# Patient Record
Sex: FEMALE | Race: AMERICAN INDIAN OR ALASKA NATIVE | HISPANIC OR LATINO
[De-identification: names, ages, dates, MRNs, and addresses within clinical notes are randomized per-mention and may not be internally consistent; named-entity substitution may affect disease eponyms.]

---

## 2023-08-29 PROBLEM — Z00.00 ENCOUNTER FOR PREVENTIVE HEALTH EXAMINATION: Status: ACTIVE | Noted: 2023-08-29

## 2023-09-06 ENCOUNTER — LABORATORY RESULT (OUTPATIENT)
Age: 33
End: 2023-09-06

## 2023-09-07 ENCOUNTER — NON-APPOINTMENT (OUTPATIENT)
Age: 33
End: 2023-09-07

## 2023-09-07 ENCOUNTER — APPOINTMENT (OUTPATIENT)
Dept: OBGYN | Facility: CLINIC | Age: 33
End: 2023-09-07
Payer: COMMERCIAL

## 2023-09-07 DIAGNOSIS — N91.2 AMENORRHEA, UNSPECIFIED: ICD-10-CM

## 2023-09-07 PROCEDURE — 76817 TRANSVAGINAL US OBSTETRIC: CPT

## 2023-09-07 PROCEDURE — 99204 OFFICE O/P NEW MOD 45 MIN: CPT

## 2023-09-07 NOTE — HISTORY OF PRESENT ILLNESS
[FreeTextEntry1] : ----32 Y/O   LMP 23 EDC  24 EGA 6 WEEKS. PMHX;/ PSHX;/APPY, ADENOIDS SOCIAL HX;-ETOH -CIGG  STD;    HPV/        STD PREVENTION DISCUSSED FAMILY HISTORY OF BREAST CANCER;MATERNAL GREAT GM REVIEW OF SYMPTOMS DONE; ALLERGIES; pt answered NKDA Medication reconciliation was completed by reviewing, with the patient's involvement, the patient's current outpatient medications and those  ordered for the patient today.           PE; ABDOMEN;SOFT NT ND NL GENITALIA VAGINA -DC CERVIX;-CMT UTERUS;6 WEEKS SIZE NT AV ADNEXA; NT - MASSES   A;P;EARLY PREGNANCY -PAP  GC CHLAMYDIA -LABS -MVI -SONO -RTC 4 WEEKS.

## 2023-09-08 ENCOUNTER — NON-APPOINTMENT (OUTPATIENT)
Age: 33
End: 2023-09-08

## 2023-09-08 LAB
BASOPHILS # BLD AUTO: 0.05 K/UL
BASOPHILS NFR BLD AUTO: 0.6 %
EOSINOPHIL # BLD AUTO: 0.09 K/UL
EOSINOPHIL NFR BLD AUTO: 1 %
HCT VFR BLD CALC: 38.2 %
HGB BLD-MCNC: 12.6 G/DL
IMM GRANULOCYTES NFR BLD AUTO: 0.2 %
LYMPHOCYTES # BLD AUTO: 2.26 K/UL
LYMPHOCYTES NFR BLD AUTO: 25.4 %
MAN DIFF?: NORMAL
MCHC RBC-ENTMCNC: 29.2 PG
MCHC RBC-ENTMCNC: 33 G/DL
MCV RBC AUTO: 88.4 FL
MONOCYTES # BLD AUTO: 0.63 K/UL
MONOCYTES NFR BLD AUTO: 7.1 %
NEUTROPHILS # BLD AUTO: 5.84 K/UL
NEUTROPHILS NFR BLD AUTO: 65.7 %
PLATELET # BLD AUTO: 361 K/UL
RBC # BLD: 4.32 M/UL
RBC # FLD: 12.6 %
WBC # FLD AUTO: 8.89 K/UL

## 2023-09-10 LAB
ABO + RH PNL BLD: NORMAL
BLD GP AB SCN SERPL QL: NORMAL
C TRACH RRNA SPEC QL NAA+PROBE: NOT DETECTED
HBV SURFACE AG SER QL: NONREACTIVE
HCG SERPL-MCNC: ABNORMAL MIU/ML
HGB A MFR BLD: 97.5 %
HGB A2 MFR BLD: 2.5 %
HGB FRACT BLD-IMP: NORMAL
HIV1+2 AB SPEC QL IA.RAPID: NONREACTIVE
HPV HIGH+LOW RISK DNA PNL CVX: DETECTED
MEV IGG FLD QL IA: 14.7 AU/ML
MEV IGG+IGM SER-IMP: NORMAL
N GONORRHOEA RRNA SPEC QL NAA+PROBE: NOT DETECTED
PROGEST SERPL-MCNC: 16.7 NG/ML
RUBV IGG FLD-ACNC: 19 INDEX
RUBV IGG SER-IMP: POSITIVE
SOURCE AMPLIFICATION: NORMAL
T PALLIDUM AB SER QL IA: NEGATIVE
VZV AB TITR SER: NEGATIVE
VZV IGG SER IF-ACNC: 49.3 INDEX

## 2023-09-11 ENCOUNTER — NON-APPOINTMENT (OUTPATIENT)
Age: 33
End: 2023-09-11

## 2023-09-15 ENCOUNTER — NON-APPOINTMENT (OUTPATIENT)
Age: 33
End: 2023-09-15

## 2023-09-18 LAB
AR GENE MUT ANL BLD/T: NORMAL
CFTR MUT TESTED BLD/T: NEGATIVE

## 2023-09-19 LAB — CYTOLOGY CVX/VAG DOC THIN PREP: ABNORMAL

## 2023-09-21 ENCOUNTER — APPOINTMENT (OUTPATIENT)
Dept: OBGYN | Facility: CLINIC | Age: 33
End: 2023-09-21
Payer: COMMERCIAL

## 2023-09-21 PROCEDURE — 76817 TRANSVAGINAL US OBSTETRIC: CPT

## 2023-10-12 ENCOUNTER — APPOINTMENT (OUTPATIENT)
Dept: OBGYN | Facility: CLINIC | Age: 33
End: 2023-10-12
Payer: COMMERCIAL

## 2023-10-12 PROCEDURE — 0502F SUBSEQUENT PRENATAL CARE: CPT

## 2023-10-20 LAB
CHROMOSOME13 INTERPRETATION: NORMAL
CHROMOSOME13 TEST RESULT: NORMAL
CHROMOSOME18 INTERPRETATION: NORMAL
CHROMOSOME18 TEST RESULT: NORMAL
CHROMOSOME21 INTERPRETATION: NORMAL
CHROMOSOME21 TEST RESULT: NORMAL
FETAL FRACTION: NORMAL
MICRODELETIONS INTERPRETATION: NORMAL
MICRODELETIONS RESULT: NORMAL
PERFORMANCE AND LIMITATIONS: NORMAL
SEX CHROMOSOME INTERPRETATION: NORMAL
SEX CHROMOSOME TEST RESULT: NORMAL
VERIFI WITH MICRODELETIONS: NOT DETECTED

## 2023-10-23 ENCOUNTER — NON-APPOINTMENT (OUTPATIENT)
Age: 33
End: 2023-10-23

## 2023-10-24 ENCOUNTER — ASOB RESULT (OUTPATIENT)
Age: 33
End: 2023-10-24

## 2023-10-24 ENCOUNTER — APPOINTMENT (OUTPATIENT)
Dept: ANTEPARTUM | Facility: CLINIC | Age: 33
End: 2023-10-24
Payer: COMMERCIAL

## 2023-10-24 VITALS
HEART RATE: 89 BPM | DIASTOLIC BLOOD PRESSURE: 75 MMHG | BODY MASS INDEX: 29.19 KG/M2 | SYSTOLIC BLOOD PRESSURE: 118 MMHG | HEIGHT: 64 IN | WEIGHT: 171 LBS

## 2023-10-24 PROCEDURE — 76813 OB US NUCHAL MEAS 1 GEST: CPT

## 2023-11-10 ENCOUNTER — APPOINTMENT (OUTPATIENT)
Dept: OBGYN | Facility: CLINIC | Age: 33
End: 2023-11-10

## 2023-11-13 ENCOUNTER — APPOINTMENT (OUTPATIENT)
Dept: OBGYN | Facility: CLINIC | Age: 33
End: 2023-11-13
Payer: COMMERCIAL

## 2023-11-13 PROCEDURE — 0502F SUBSEQUENT PRENATAL CARE: CPT

## 2023-11-20 LAB
AFP MOM: 1.58
AFP VALUE: 49.3 NG/ML
ALPHA FETOPROTEIN SERUM COMMENT: NORMAL
ALPHA FETOPROTEIN SERUM INTERPRETATION: NORMAL
ALPHA FETOPROTEIN SERUM RESULTS: NORMAL
ALPHA FETOPROTEIN SERUM TEST RESULTS: NORMAL
GESTATIONAL AGE BASED ON: NORMAL
GESTATIONAL AGE ON COLLECTION DATE: 16 WEEKS
INSULIN DEP DIABETES: NO
MATERNAL AGE AT EDD AFP: 33.8 YR
MULTIPLE GESTATION: NO
OSBR RISK 1 IN: 2212
RACE: NORMAL
WEIGHT AFP: 170 LBS

## 2023-11-24 ENCOUNTER — NON-APPOINTMENT (OUTPATIENT)
Age: 33
End: 2023-11-24

## 2023-12-14 ENCOUNTER — ASOB RESULT (OUTPATIENT)
Age: 33
End: 2023-12-14

## 2023-12-14 ENCOUNTER — APPOINTMENT (OUTPATIENT)
Dept: ANTEPARTUM | Facility: CLINIC | Age: 33
End: 2023-12-14
Payer: COMMERCIAL

## 2023-12-14 VITALS
HEART RATE: 100 BPM | HEIGHT: 64 IN | BODY MASS INDEX: 29.88 KG/M2 | DIASTOLIC BLOOD PRESSURE: 68 MMHG | WEIGHT: 175 LBS | SYSTOLIC BLOOD PRESSURE: 116 MMHG

## 2023-12-14 PROCEDURE — 76805 OB US >/= 14 WKS SNGL FETUS: CPT | Mod: 59

## 2023-12-14 PROCEDURE — 76817 TRANSVAGINAL US OBSTETRIC: CPT

## 2023-12-18 ENCOUNTER — APPOINTMENT (OUTPATIENT)
Dept: OBGYN | Facility: CLINIC | Age: 33
End: 2023-12-18
Payer: COMMERCIAL

## 2023-12-18 PROCEDURE — 0502F SUBSEQUENT PRENATAL CARE: CPT

## 2024-01-18 ENCOUNTER — APPOINTMENT (OUTPATIENT)
Dept: OBGYN | Facility: CLINIC | Age: 34
End: 2024-01-18
Payer: COMMERCIAL

## 2024-01-18 PROCEDURE — 0502F SUBSEQUENT PRENATAL CARE: CPT

## 2024-02-12 ENCOUNTER — APPOINTMENT (OUTPATIENT)
Dept: OBGYN | Facility: CLINIC | Age: 34
End: 2024-02-12
Payer: COMMERCIAL

## 2024-02-12 PROCEDURE — 0502F SUBSEQUENT PRENATAL CARE: CPT

## 2024-02-14 LAB
BASOPHILS # BLD AUTO: 0.04 K/UL
BASOPHILS NFR BLD AUTO: 0.4 %
EOSINOPHIL # BLD AUTO: 0.12 K/UL
EOSINOPHIL NFR BLD AUTO: 1.1 %
GLUCOSE 1H P 50 G GLC PO SERPL-MCNC: 148 MG/DL
HCT VFR BLD CALC: 33.2 %
HGB BLD-MCNC: 10.8 G/DL
IMM GRANULOCYTES NFR BLD AUTO: 0.9 %
LYMPHOCYTES # BLD AUTO: 1.73 K/UL
LYMPHOCYTES NFR BLD AUTO: 15.5 %
MAN DIFF?: NORMAL
MCHC RBC-ENTMCNC: 29.2 PG
MCHC RBC-ENTMCNC: 32.5 G/DL
MCV RBC AUTO: 89.7 FL
MONOCYTES # BLD AUTO: 0.69 K/UL
MONOCYTES NFR BLD AUTO: 6.2 %
NEUTROPHILS # BLD AUTO: 8.5 K/UL
NEUTROPHILS NFR BLD AUTO: 75.9 %
PLATELET # BLD AUTO: 318 K/UL
PMV BLD AUTO: 0 /100 WBCS
RBC # BLD: 3.7 M/UL
RBC # FLD: 13.5 %
WBC # FLD AUTO: 11.18 K/UL

## 2024-02-15 ENCOUNTER — NON-APPOINTMENT (OUTPATIENT)
Age: 34
End: 2024-02-15

## 2024-02-21 ENCOUNTER — NON-APPOINTMENT (OUTPATIENT)
Age: 34
End: 2024-02-21

## 2024-02-22 ENCOUNTER — NON-APPOINTMENT (OUTPATIENT)
Age: 34
End: 2024-02-22

## 2024-03-01 ENCOUNTER — NON-APPOINTMENT (OUTPATIENT)
Age: 34
End: 2024-03-01

## 2024-03-04 ENCOUNTER — NON-APPOINTMENT (OUTPATIENT)
Age: 34
End: 2024-03-04

## 2024-03-07 ENCOUNTER — APPOINTMENT (OUTPATIENT)
Dept: ANTEPARTUM | Facility: CLINIC | Age: 34
End: 2024-03-07
Payer: COMMERCIAL

## 2024-03-07 ENCOUNTER — ASOB RESULT (OUTPATIENT)
Age: 34
End: 2024-03-07

## 2024-03-07 VITALS
HEIGHT: 64 IN | DIASTOLIC BLOOD PRESSURE: 76 MMHG | SYSTOLIC BLOOD PRESSURE: 118 MMHG | BODY MASS INDEX: 31.07 KG/M2 | HEART RATE: 100 BPM | WEIGHT: 182 LBS

## 2024-03-07 PROCEDURE — 76816 OB US FOLLOW-UP PER FETUS: CPT

## 2024-03-14 ENCOUNTER — APPOINTMENT (OUTPATIENT)
Dept: OBGYN | Facility: CLINIC | Age: 34
End: 2024-03-14
Payer: COMMERCIAL

## 2024-03-14 PROCEDURE — 0502F SUBSEQUENT PRENATAL CARE: CPT

## 2024-03-28 ENCOUNTER — APPOINTMENT (OUTPATIENT)
Dept: OBGYN | Facility: CLINIC | Age: 34
End: 2024-03-28
Payer: COMMERCIAL

## 2024-03-28 PROCEDURE — 0502F SUBSEQUENT PRENATAL CARE: CPT

## 2024-04-04 ENCOUNTER — APPOINTMENT (OUTPATIENT)
Dept: OBGYN | Facility: CLINIC | Age: 34
End: 2024-04-04
Payer: COMMERCIAL

## 2024-04-04 PROCEDURE — 76819 FETAL BIOPHYS PROFIL W/O NST: CPT | Mod: 59

## 2024-04-04 PROCEDURE — 0502F SUBSEQUENT PRENATAL CARE: CPT

## 2024-04-04 PROCEDURE — 76816 OB US FOLLOW-UP PER FETUS: CPT

## 2024-04-07 LAB — B-HEM STREP SPEC QL CULT: NORMAL

## 2024-04-11 ENCOUNTER — APPOINTMENT (OUTPATIENT)
Dept: OBGYN | Facility: CLINIC | Age: 34
End: 2024-04-11
Payer: COMMERCIAL

## 2024-04-11 VITALS
DIASTOLIC BLOOD PRESSURE: 89 MMHG | BODY MASS INDEX: 31.07 KG/M2 | WEIGHT: 182 LBS | SYSTOLIC BLOOD PRESSURE: 129 MMHG | HEIGHT: 64 IN

## 2024-04-11 LAB
BILIRUB UR QL STRIP: NORMAL
GLUCOSE UR-MCNC: NORMAL
HCG UR QL: 0.2 EU/DL
HGB UR QL STRIP.AUTO: NORMAL
KETONES UR-MCNC: NORMAL
LEUKOCYTE ESTERASE UR QL STRIP: NORMAL
NITRITE UR QL STRIP: NORMAL
PH UR STRIP: 6.5
PROT UR STRIP-MCNC: NORMAL
SP GR UR STRIP: 1.02

## 2024-04-11 PROCEDURE — 0502F SUBSEQUENT PRENATAL CARE: CPT

## 2024-04-18 ENCOUNTER — APPOINTMENT (OUTPATIENT)
Dept: OBGYN | Facility: CLINIC | Age: 34
End: 2024-04-18
Payer: COMMERCIAL

## 2024-04-18 PROCEDURE — 76815 OB US LIMITED FETUS(S): CPT

## 2024-04-18 PROCEDURE — 0502F SUBSEQUENT PRENATAL CARE: CPT

## 2024-04-18 PROCEDURE — 76819 FETAL BIOPHYS PROFIL W/O NST: CPT

## 2024-04-23 ENCOUNTER — APPOINTMENT (OUTPATIENT)
Dept: OBGYN | Facility: CLINIC | Age: 34
End: 2024-04-23
Payer: COMMERCIAL

## 2024-04-23 VITALS
HEIGHT: 64 IN | BODY MASS INDEX: 31.07 KG/M2 | WEIGHT: 182 LBS | SYSTOLIC BLOOD PRESSURE: 115 MMHG | DIASTOLIC BLOOD PRESSURE: 81 MMHG

## 2024-04-23 DIAGNOSIS — Z3A.30 30 WEEKS GESTATION OF PREGNANCY: ICD-10-CM

## 2024-04-23 PROCEDURE — 0502F SUBSEQUENT PRENATAL CARE: CPT

## 2024-04-24 LAB
BILIRUB UR QL STRIP: NORMAL
GLUCOSE UR-MCNC: NORMAL
HCG UR QL: 0.2 EU/DL
HGB UR QL STRIP.AUTO: NORMAL
KETONES UR-MCNC: NORMAL
LEUKOCYTE ESTERASE UR QL STRIP: NORMAL
NITRITE UR QL STRIP: NORMAL
PH UR STRIP: 7
PROT UR STRIP-MCNC: NORMAL
SP GR UR STRIP: 1.02

## 2024-05-02 ENCOUNTER — APPOINTMENT (OUTPATIENT)
Dept: OBGYN | Facility: CLINIC | Age: 34
End: 2024-05-02
Payer: COMMERCIAL

## 2024-05-02 PROCEDURE — 76818 FETAL BIOPHYS PROFILE W/NST: CPT | Mod: 59

## 2024-05-02 PROCEDURE — 0502F SUBSEQUENT PRENATAL CARE: CPT

## 2024-05-02 PROCEDURE — 76816 OB US FOLLOW-UP PER FETUS: CPT

## 2024-05-06 ENCOUNTER — INPATIENT (INPATIENT)
Facility: HOSPITAL | Age: 34
LOS: 3 days | Discharge: HOME | End: 2024-05-10
Attending: OBSTETRICS & GYNECOLOGY | Admitting: OBSTETRICS & GYNECOLOGY
Payer: COMMERCIAL

## 2024-05-06 ENCOUNTER — NON-APPOINTMENT (OUTPATIENT)
Age: 34
End: 2024-05-06

## 2024-05-06 ENCOUNTER — APPOINTMENT (OUTPATIENT)
Dept: OBGYN | Facility: CLINIC | Age: 34
End: 2024-05-06

## 2024-05-06 VITALS — DIASTOLIC BLOOD PRESSURE: 69 MMHG | SYSTOLIC BLOOD PRESSURE: 133 MMHG | HEART RATE: 83 BPM

## 2024-05-06 DIAGNOSIS — O26.899 OTHER SPECIFIED PREGNANCY RELATED CONDITIONS, UNSPECIFIED TRIMESTER: ICD-10-CM

## 2024-05-06 DIAGNOSIS — O61.0 FAILED MEDICAL INDUCTION OF LABOR: ICD-10-CM

## 2024-05-06 DIAGNOSIS — Z98.890 OTHER SPECIFIED POSTPROCEDURAL STATES: Chronic | ICD-10-CM

## 2024-05-06 DIAGNOSIS — Z90.49 ACQUIRED ABSENCE OF OTHER SPECIFIED PARTS OF DIGESTIVE TRACT: Chronic | ICD-10-CM

## 2024-05-06 LAB
APPEARANCE UR: CLEAR — SIGNIFICANT CHANGE UP
BACTERIA # UR AUTO: NEGATIVE /HPF — SIGNIFICANT CHANGE UP
BASOPHILS # BLD AUTO: 0.03 K/UL — SIGNIFICANT CHANGE UP (ref 0–0.2)
BASOPHILS NFR BLD AUTO: 0.2 % — SIGNIFICANT CHANGE UP (ref 0–1)
BILIRUB UR-MCNC: NEGATIVE — SIGNIFICANT CHANGE UP
BLD GP AB SCN SERPL QL: SIGNIFICANT CHANGE UP
CAST: 0 /LPF — SIGNIFICANT CHANGE UP (ref 0–4)
COLOR SPEC: YELLOW — SIGNIFICANT CHANGE UP
DIFF PNL FLD: ABNORMAL
EOSINOPHIL # BLD AUTO: 0.03 K/UL — SIGNIFICANT CHANGE UP (ref 0–0.7)
EOSINOPHIL NFR BLD AUTO: 0.2 % — SIGNIFICANT CHANGE UP (ref 0–8)
GLUCOSE UR QL: NEGATIVE MG/DL — SIGNIFICANT CHANGE UP
HCT VFR BLD CALC: 36.8 % — LOW (ref 37–47)
HCV AB S/CO SERPL IA: 0.05 COI — SIGNIFICANT CHANGE UP
HCV AB SERPL-IMP: SIGNIFICANT CHANGE UP
HGB BLD-MCNC: 12.4 G/DL — SIGNIFICANT CHANGE UP (ref 12–16)
HIV 1 & 2 AB SERPL IA.RAPID: SIGNIFICANT CHANGE UP
IMM GRANULOCYTES NFR BLD AUTO: 0.4 % — HIGH (ref 0.1–0.3)
KETONES UR-MCNC: ABNORMAL MG/DL
LEUKOCYTE ESTERASE UR-ACNC: NEGATIVE — SIGNIFICANT CHANGE UP
LYMPHOCYTES # BLD AUTO: 1.73 K/UL — SIGNIFICANT CHANGE UP (ref 1.2–3.4)
LYMPHOCYTES # BLD AUTO: 13.4 % — LOW (ref 20.5–51.1)
MCHC RBC-ENTMCNC: 29 PG — SIGNIFICANT CHANGE UP (ref 27–31)
MCHC RBC-ENTMCNC: 33.7 G/DL — SIGNIFICANT CHANGE UP (ref 32–37)
MCV RBC AUTO: 86.2 FL — SIGNIFICANT CHANGE UP (ref 81–99)
MONOCYTES # BLD AUTO: 0.78 K/UL — HIGH (ref 0.1–0.6)
MONOCYTES NFR BLD AUTO: 6 % — SIGNIFICANT CHANGE UP (ref 1.7–9.3)
NEUTROPHILS # BLD AUTO: 10.32 K/UL — HIGH (ref 1.4–6.5)
NEUTROPHILS NFR BLD AUTO: 79.8 % — HIGH (ref 42.2–75.2)
NITRITE UR-MCNC: NEGATIVE — SIGNIFICANT CHANGE UP
NRBC # BLD: 0 /100 WBCS — SIGNIFICANT CHANGE UP (ref 0–0)
PH UR: 7 — SIGNIFICANT CHANGE UP (ref 5–8)
PLATELET # BLD AUTO: 322 K/UL — SIGNIFICANT CHANGE UP (ref 130–400)
PMV BLD: 9.6 FL — SIGNIFICANT CHANGE UP (ref 7.4–10.4)
PRENATAL SYPHILIS TEST: SIGNIFICANT CHANGE UP
PROT UR-MCNC: NEGATIVE MG/DL — SIGNIFICANT CHANGE UP
RBC # BLD: 4.27 M/UL — SIGNIFICANT CHANGE UP (ref 4.2–5.4)
RBC # FLD: 14.2 % — SIGNIFICANT CHANGE UP (ref 11.5–14.5)
RBC CASTS # UR COMP ASSIST: 13 /HPF — HIGH (ref 0–4)
SP GR SPEC: 1 — SIGNIFICANT CHANGE UP (ref 1–1.03)
SQUAMOUS # UR AUTO: 1 /HPF — SIGNIFICANT CHANGE UP (ref 0–5)
UROBILINOGEN FLD QL: 0.2 MG/DL — SIGNIFICANT CHANGE UP (ref 0.2–1)
WBC # BLD: 12.94 K/UL — HIGH (ref 4.8–10.8)
WBC # FLD AUTO: 12.94 K/UL — HIGH (ref 4.8–10.8)
WBC UR QL: 0 /HPF — SIGNIFICANT CHANGE UP (ref 0–5)

## 2024-05-06 PROCEDURE — 90707 MMR VACCINE SC: CPT

## 2024-05-06 PROCEDURE — 86850 RBC ANTIBODY SCREEN: CPT

## 2024-05-06 PROCEDURE — 86900 BLOOD TYPING SEROLOGIC ABO: CPT

## 2024-05-06 PROCEDURE — 59510 CESAREAN DELIVERY: CPT

## 2024-05-06 PROCEDURE — 90716 VAR VACCINE LIVE SUBQ: CPT

## 2024-05-06 PROCEDURE — 86803 HEPATITIS C AB TEST: CPT

## 2024-05-06 PROCEDURE — 36415 COLL VENOUS BLD VENIPUNCTURE: CPT

## 2024-05-06 PROCEDURE — 86703 HIV-1/HIV-2 1 RESULT ANTBDY: CPT

## 2024-05-06 PROCEDURE — 88307 TISSUE EXAM BY PATHOLOGIST: CPT

## 2024-05-06 PROCEDURE — 81001 URINALYSIS AUTO W/SCOPE: CPT

## 2024-05-06 PROCEDURE — 86901 BLOOD TYPING SEROLOGIC RH(D): CPT

## 2024-05-06 PROCEDURE — 59025 FETAL NON-STRESS TEST: CPT

## 2024-05-06 PROCEDURE — 85025 COMPLETE CBC W/AUTO DIFF WBC: CPT

## 2024-05-06 PROCEDURE — 86592 SYPHILIS TEST NON-TREP QUAL: CPT

## 2024-05-06 RX ORDER — OXYTOCIN 10 UNIT/ML
333.33 VIAL (ML) INJECTION
Qty: 20 | Refills: 0 | Status: DISCONTINUED | OUTPATIENT
Start: 2024-05-06 | End: 2024-05-10

## 2024-05-06 RX ORDER — SODIUM CHLORIDE 9 MG/ML
1000 INJECTION, SOLUTION INTRAVENOUS
Refills: 0 | Status: DISCONTINUED | OUTPATIENT
Start: 2024-05-06 | End: 2024-05-07

## 2024-05-06 RX ORDER — FENTANYL/BUPIVACAINE/NS/PF 2MCG/ML-.1
250 PLASTIC BAG, INJECTION (ML) INJECTION
Refills: 0 | Status: DISCONTINUED | OUTPATIENT
Start: 2024-05-06 | End: 2024-05-10

## 2024-05-06 RX ORDER — OXYTOCIN 10 UNIT/ML
2 VIAL (ML) INJECTION
Qty: 30 | Refills: 0 | Status: DISCONTINUED | OUTPATIENT
Start: 2024-05-06 | End: 2024-05-10

## 2024-05-06 RX ORDER — NALOXONE HYDROCHLORIDE 4 MG/.1ML
0.1 SPRAY NASAL
Refills: 0 | Status: DISCONTINUED | OUTPATIENT
Start: 2024-05-06 | End: 2024-05-10

## 2024-05-06 RX ORDER — DEXAMETHASONE 0.5 MG/5ML
4 ELIXIR ORAL EVERY 6 HOURS
Refills: 0 | Status: DISCONTINUED | OUTPATIENT
Start: 2024-05-06 | End: 2024-05-10

## 2024-05-06 RX ORDER — ONDANSETRON 8 MG/1
4 TABLET, FILM COATED ORAL EVERY 6 HOURS
Refills: 0 | Status: DISCONTINUED | OUTPATIENT
Start: 2024-05-06 | End: 2024-05-10

## 2024-05-06 RX ADMIN — Medication 2 MILLIUNIT(S)/MIN: at 15:38

## 2024-05-06 RX ADMIN — SODIUM CHLORIDE 125 MILLILITER(S): 9 INJECTION, SOLUTION INTRAVENOUS at 20:22

## 2024-05-06 NOTE — PROGRESS NOTE ADULT - ASSESSMENT
32yo  at 41w2d, GBS neg, in active labor  -pain management prn   -continous ISE, IUPC  -fetal resuscitation as needed  -IV hydration   -diet: clear liquid diet   -re-examine in 1-2 hours

## 2024-05-06 NOTE — OB PROVIDER H&P - NSHPPHYSICALEXAM_GEN_ALL_CORE
Vital Signs Last 24 Hrs  HR: 83 (06 May 2024 11:12) (83 - 83)  BP: 133/69 (06 May 2024 11:12) (133/69 - 133/69)    Physical Exam  Gen: AAOx3, NAD  Abd: soft, gravid, nontender, palpable contractions  Ext: no edema or erythema in bilateral upper and lower extremities  SVE: 1/50/-3    EFM: 140/mod/+accels  Saxon: q3min

## 2024-05-06 NOTE — PROGRESS NOTE ADULT - SUBJECTIVE AND OBJECTIVE BOX
PGY3 Progress Note    patient seen at bedside, no complaints      Vital Signs Last 24 Hrs  T(C): 37.2 (06 May 2024 21:05), Max: 37.2 (06 May 2024 21:05)  T(F): 98.96 (06 May 2024 21:05), Max: 98.96 (06 May 2024 21:05)  HR: 93 (06 May 2024 22:39) (62 - 106)  BP: 112/73 (06 May 2024 22:28) (82/47 - 151/81)  BP(mean): --  RR: --  SpO2: 98% (06 May 2024 22:39) (94% - 100%)      ISE: 150BPM/mod francia/no accels, occasional late and variable decels  IUPC: 180-200MVU  SVE: 7/70/-1    Medications:  lactated ringers.: 125 mL/Hr (24 @ 20:08)    Labs:                        12.4   12.94 )-----------( 322      ( 06 May 2024 11:50 )             36.8           ABO RH Interpretation: B POS (24 @ 11:50)    Urinalysis Basic - ( 06 May 2024 15:04 )    Color: Yellow / Appearance: Clear / S.005 / pH: x  Gluc: x / Ketone: Trace mg/dL  / Bili: Negative / Urobili: 0.2 mg/dL   Blood: x / Protein: Negative mg/dL / Nitrite: Negative   Leuk Esterase: Negative / RBC: 13 /HPF / WBC 0 /HPF   Sq Epi: x / Non Sq Epi: 1 /HPF / Bacteria: Negative /HPF

## 2024-05-06 NOTE — OB PROVIDER H&P - NS_FHRDECEL_OBGYN_ALL_OB
Contacted patient regarding referral with [facility]. Scheduled appointment.  Graciela Baig RN   No Decelerations

## 2024-05-06 NOTE — PROGRESS NOTE ADULT - SUBJECTIVE AND OBJECTIVE BOX
PGY3 Progress Note    patient seen at bedside, no complaints      Vital Signs Last 24 Hrs  T(C): 37.2 (06 May 2024 21:05), Max: 37.2 (06 May 2024 21:05)  T(F): 98.96 (06 May 2024 21:05), Max: 98.96 (06 May 2024 21:05)  HR: 93 (06 May 2024 22:39) (62 - 106)  BP: 112/73 (06 May 2024 22:28) (82/47 - 151/81)  BP(mean): --  RR: --  SpO2: 98% (06 May 2024 22:39) (94% - 100%)      ISE: 155BPM/mod francia/no accels, late decelerations  IUPC: 180-200MVU  SVE: /-1    Medications:  lactated ringers.: 125 mL/Hr (24 @ 20:08)    Labs:                        12.4   12.94 )-----------( 322      ( 06 May 2024 11:50 )             36.8           ABO RH Interpretation: B POS (24 @ 11:50)    Urinalysis Basic - ( 06 May 2024 15:04 )    Color: Yellow / Appearance: Clear / S.005 / pH: x  Gluc: x / Ketone: Trace mg/dL  / Bili: Negative / Urobili: 0.2 mg/dL   Blood: x / Protein: Negative mg/dL / Nitrite: Negative   Leuk Esterase: Negative / RBC: 13 /HPF / WBC 0 /HPF   Sq Epi: x / Non Sq Epi: 1 /HPF / Bacteria: Negative /HPF

## 2024-05-06 NOTE — PROGRESS NOTE ADULT - ASSESSMENT
34yo  at 41w2d, GBS neg, in active labor  -pain management prn   -continous ISE, IUPC  -fetal resuscitation as needed  -IV hydration   -diet: clear liquid diet   -re-examine in 1-2 hours

## 2024-05-06 NOTE — PROCEDURE NOTE - ADDITIONAL PROCEDURE DETAILS
Patient tolerated procedure well. Hemodynamically stable, degree of motor block appropriate for epidural medication administered. Patient is aware that the anesthesia team is available 24/7, in case she needs more pain medication or has any other anesthesia-related needs or questions.   .0625% Bupivacaine +2ucg/cc Fentanyl epidural PCEA infusion started Patient tolerated procedure well. Hemodynamically stable, degree of motor block appropriate for epidural medication administered. Patient is aware that the anesthesia team is available 24/7, in case she needs more pain medication or has any other anesthesia-related needs or questions.   .0625% Bupivacaine +2ucg/cc Fentanyl epidural PCEA infusion started      1945  FH decel for ~ 6mins  Pitocin stopped  Recovery to baseline  Hemodynamically stable post epidural placement  Analgesia at T10 R=L  FH now 130s Patient tolerated procedure well. Hemodynamically stable, degree of motor block appropriate for epidural medication administered. Patient is aware that the anesthesia team is available 24/7, in case she needs more pain medication or has any other anesthesia-related needs or questions.   .0625% Bupivacaine +2ucg/cc Fentanyl epidural PCEA infusion started      1945  FH decel for ~ 6mins  Pitocin stopped  Recovery to baseline  Hemodynamically stable post epidural placement  Analgesia at T10 R=L  FH now 130s    0105  C/S called for no progress Stage 1 with decels withe each contraction  Plan epidural anaesthesia and epidural morpine

## 2024-05-06 NOTE — PROGRESS NOTE ADULT - SUBJECTIVE AND OBJECTIVE BOX
fhr 140s gbtbv with late deceleartions that recovered  ctx q 1-2min  sve 5/90/-1  pt in lld position  monitor closely.

## 2024-05-06 NOTE — OB PROVIDER H&P - NSLOWPPHRISK_OBGYN_A_OB
No previous uterine incision/Lo Pregnancy/Less than or equal to 4 previous vaginal births/No known bleeding disorder/No history of postpartum hemorrhage/No other PPH risks indicated

## 2024-05-06 NOTE — OB PROVIDER H&P - HISTORY OF PRESENT ILLNESS
34yo  at 41w2d GA (ALBIN: 24, by LMP) presents to labor and delivery with contractions increasing in frequency and duration. Denies vaginal bleeding, leakage of fluids. Endorses good fetal movement. GBS neg.    Pregnancy complicated by:  - h/o asthma: endorses h/o hospitalization, denies intubations, last used inhaler at 23yo  - elevated GCT, normal GTT  - h/o SAB with D+C  - h/o appendectomy  - measles equivocal, varicella nonimmune

## 2024-05-06 NOTE — OB PROVIDER LABOR PROGRESS NOTE - ASSESSMENT
34y/o  at 41w 2d, admitted for IOL for postdates; now with CAT II tracing. s/p AROM'ed clear at 16:16pm    -Pitocin was discontinued, patient positioned in left lateral position, O2 given by facemask, IV hydration  -Continue EFM/TOCO  -Continue w/ current resuscitation   -Continue IV hydration  -Epidural in place  -Continue close monitoring    Dr. Mendez aware

## 2024-05-06 NOTE — OB PROVIDER H&P - NSHPLABSRESULTS_GEN_ALL_CORE
4/4/24  GBS neg    2/21/24  GTT 82/178/152/77    2/12/24      SONOS  32w5d: vertex, anterior placenta, EFW 2375gm (85%ile)  20w5d: normal anatomy scan  13w3d: NT wnl

## 2024-05-06 NOTE — OB PROVIDER LABOR PROGRESS NOTE - NS_SUBJECTIVE/OBJECTIVE_OBGYN_ALL_OB_FT
Patient seen at bedside, s/p epidural with FHR deceleration.  HR: 76 (05-06-24 @ 16:23) (62 - 94)  BP: 115/59 (05-06-24 @ 16:23) (82/47 - 133/69)  SpO2: 98% (05-06-24 @ 16:24) (94% - 99%)

## 2024-05-06 NOTE — OB PROVIDER H&P - ASSESSMENT
A/P: 34yo  at 41w2d, GBS neg, requesting pain relief    -admit to labor and delivery  -pain management prn   -continous efm & toco  -admission labs  -IV access   -IV hydration   -diet: clear liquid diet     Case discussed with Dr. Pina and Dr. Mendez

## 2024-05-07 ENCOUNTER — RESULT REVIEW (OUTPATIENT)
Age: 34
End: 2024-05-07

## 2024-05-07 LAB
BASOPHILS # BLD AUTO: 0.03 K/UL — SIGNIFICANT CHANGE UP (ref 0–0.2)
BASOPHILS NFR BLD AUTO: 0.2 % — SIGNIFICANT CHANGE UP (ref 0–1)
EOSINOPHIL # BLD AUTO: 0.08 K/UL — SIGNIFICANT CHANGE UP (ref 0–0.7)
EOSINOPHIL NFR BLD AUTO: 0.6 % — SIGNIFICANT CHANGE UP (ref 0–8)
HCT VFR BLD CALC: 27 % — LOW (ref 37–47)
HGB BLD-MCNC: 9.2 G/DL — LOW (ref 12–16)
IMM GRANULOCYTES NFR BLD AUTO: 0.6 % — HIGH (ref 0.1–0.3)
LYMPHOCYTES # BLD AUTO: 14.7 % — LOW (ref 20.5–51.1)
LYMPHOCYTES # BLD AUTO: 2.01 K/UL — SIGNIFICANT CHANGE UP (ref 1.2–3.4)
MCHC RBC-ENTMCNC: 29.7 PG — SIGNIFICANT CHANGE UP (ref 27–31)
MCHC RBC-ENTMCNC: 34.1 G/DL — SIGNIFICANT CHANGE UP (ref 32–37)
MCV RBC AUTO: 87.1 FL — SIGNIFICANT CHANGE UP (ref 81–99)
MONOCYTES # BLD AUTO: 0.91 K/UL — HIGH (ref 0.1–0.6)
MONOCYTES NFR BLD AUTO: 6.7 % — SIGNIFICANT CHANGE UP (ref 1.7–9.3)
NEUTROPHILS # BLD AUTO: 10.52 K/UL — HIGH (ref 1.4–6.5)
NEUTROPHILS NFR BLD AUTO: 77.2 % — HIGH (ref 42.2–75.2)
NRBC # BLD: 0 /100 WBCS — SIGNIFICANT CHANGE UP (ref 0–0)
PLATELET # BLD AUTO: 252 K/UL — SIGNIFICANT CHANGE UP (ref 130–400)
PMV BLD: 9.7 FL — SIGNIFICANT CHANGE UP (ref 7.4–10.4)
RBC # BLD: 3.1 M/UL — LOW (ref 4.2–5.4)
RBC # FLD: 14.5 % — SIGNIFICANT CHANGE UP (ref 11.5–14.5)
WBC # BLD: 13.63 K/UL — HIGH (ref 4.8–10.8)
WBC # FLD AUTO: 13.63 K/UL — HIGH (ref 4.8–10.8)

## 2024-05-07 PROCEDURE — 88307 TISSUE EXAM BY PATHOLOGIST: CPT | Mod: 26

## 2024-05-07 RX ORDER — CEFAZOLIN SODIUM 1 G
2000 VIAL (EA) INJECTION ONCE
Refills: 0 | Status: COMPLETED | OUTPATIENT
Start: 2024-05-07 | End: 2024-05-07

## 2024-05-07 RX ORDER — NALOXONE HYDROCHLORIDE 4 MG/.1ML
0.1 SPRAY NASAL
Refills: 0 | Status: DISCONTINUED | OUTPATIENT
Start: 2024-05-07 | End: 2024-05-10

## 2024-05-07 RX ORDER — SODIUM CHLORIDE 9 MG/ML
1000 INJECTION, SOLUTION INTRAVENOUS
Refills: 0 | Status: DISCONTINUED | OUTPATIENT
Start: 2024-05-07 | End: 2024-05-10

## 2024-05-07 RX ORDER — AZITHROMYCIN 500 MG/1
500 TABLET, FILM COATED ORAL ONCE
Refills: 0 | Status: DISCONTINUED | OUTPATIENT
Start: 2024-05-07 | End: 2024-05-07

## 2024-05-07 RX ORDER — TETANUS TOXOID, REDUCED DIPHTHERIA TOXOID AND ACELLULAR PERTUSSIS VACCINE, ADSORBED 5; 2.5; 8; 8; 2.5 [IU]/.5ML; [IU]/.5ML; UG/.5ML; UG/.5ML; UG/.5ML
0.5 SUSPENSION INTRAMUSCULAR ONCE
Refills: 0 | Status: DISCONTINUED | OUTPATIENT
Start: 2024-05-07 | End: 2024-05-10

## 2024-05-07 RX ORDER — DEXAMETHASONE 0.5 MG/5ML
4 ELIXIR ORAL EVERY 6 HOURS
Refills: 0 | Status: DISCONTINUED | OUTPATIENT
Start: 2024-05-07 | End: 2024-05-10

## 2024-05-07 RX ORDER — MORPHINE SULFATE 50 MG/1
2 CAPSULE, EXTENDED RELEASE ORAL ONCE
Refills: 0 | Status: DISCONTINUED | OUTPATIENT
Start: 2024-05-07 | End: 2024-05-10

## 2024-05-07 RX ORDER — ACETAMINOPHEN 500 MG
1000 TABLET ORAL ONCE
Refills: 0 | Status: DISCONTINUED | OUTPATIENT
Start: 2024-05-07 | End: 2024-05-08

## 2024-05-07 RX ORDER — IBUPROFEN 200 MG
600 TABLET ORAL EVERY 6 HOURS
Refills: 0 | Status: DISCONTINUED | OUTPATIENT
Start: 2024-05-07 | End: 2024-05-10

## 2024-05-07 RX ORDER — SIMETHICONE 80 MG/1
80 TABLET, CHEWABLE ORAL EVERY 4 HOURS
Refills: 0 | Status: DISCONTINUED | OUTPATIENT
Start: 2024-05-07 | End: 2024-05-10

## 2024-05-07 RX ORDER — KETOROLAC TROMETHAMINE 30 MG/ML
30 SYRINGE (ML) INJECTION EVERY 6 HOURS
Refills: 0 | Status: DISCONTINUED | OUTPATIENT
Start: 2024-05-07 | End: 2024-05-07

## 2024-05-07 RX ORDER — IBUPROFEN 200 MG
600 TABLET ORAL EVERY 6 HOURS
Refills: 0 | Status: COMPLETED | OUTPATIENT
Start: 2024-05-07 | End: 2025-04-05

## 2024-05-07 RX ORDER — OXYCODONE HYDROCHLORIDE 5 MG/1
5 TABLET ORAL ONCE
Refills: 0 | Status: DISCONTINUED | OUTPATIENT
Start: 2024-05-07 | End: 2024-05-10

## 2024-05-07 RX ORDER — ENOXAPARIN SODIUM 100 MG/ML
40 INJECTION SUBCUTANEOUS EVERY 24 HOURS
Refills: 0 | Status: DISCONTINUED | OUTPATIENT
Start: 2024-05-07 | End: 2024-05-10

## 2024-05-07 RX ORDER — SODIUM CHLORIDE 9 MG/ML
1000 INJECTION, SOLUTION INTRAVENOUS
Refills: 0 | Status: DISCONTINUED | OUTPATIENT
Start: 2024-05-07 | End: 2024-05-07

## 2024-05-07 RX ORDER — LANOLIN
1 OINTMENT (GRAM) TOPICAL EVERY 6 HOURS
Refills: 0 | Status: DISCONTINUED | OUTPATIENT
Start: 2024-05-07 | End: 2024-05-10

## 2024-05-07 RX ORDER — ACETAMINOPHEN 500 MG
975 TABLET ORAL
Refills: 0 | Status: DISCONTINUED | OUTPATIENT
Start: 2024-05-07 | End: 2024-05-10

## 2024-05-07 RX ORDER — OXYCODONE HYDROCHLORIDE 5 MG/1
5 TABLET ORAL
Refills: 0 | Status: COMPLETED | OUTPATIENT
Start: 2024-05-07 | End: 2024-05-14

## 2024-05-07 RX ORDER — DIPHENHYDRAMINE HCL 50 MG
25 CAPSULE ORAL EVERY 6 HOURS
Refills: 0 | Status: DISCONTINUED | OUTPATIENT
Start: 2024-05-07 | End: 2024-05-10

## 2024-05-07 RX ORDER — OXYTOCIN 10 UNIT/ML
333.33 VIAL (ML) INJECTION
Qty: 20 | Refills: 0 | Status: DISCONTINUED | OUTPATIENT
Start: 2024-05-07 | End: 2024-05-10

## 2024-05-07 RX ORDER — MAGNESIUM HYDROXIDE 400 MG/1
30 TABLET, CHEWABLE ORAL
Refills: 0 | Status: DISCONTINUED | OUTPATIENT
Start: 2024-05-07 | End: 2024-05-10

## 2024-05-07 RX ORDER — ONDANSETRON 8 MG/1
4 TABLET, FILM COATED ORAL EVERY 6 HOURS
Refills: 0 | Status: DISCONTINUED | OUTPATIENT
Start: 2024-05-07 | End: 2024-05-10

## 2024-05-07 RX ADMIN — SIMETHICONE 80 MILLIGRAM(S): 80 TABLET, CHEWABLE ORAL at 13:52

## 2024-05-07 RX ADMIN — Medication 100 MILLIGRAM(S): at 00:51

## 2024-05-07 RX ADMIN — Medication 975 MILLIGRAM(S): at 09:57

## 2024-05-07 RX ADMIN — SIMETHICONE 80 MILLIGRAM(S): 80 TABLET, CHEWABLE ORAL at 10:52

## 2024-05-07 RX ADMIN — Medication 975 MILLIGRAM(S): at 22:00

## 2024-05-07 RX ADMIN — SIMETHICONE 80 MILLIGRAM(S): 80 TABLET, CHEWABLE ORAL at 05:37

## 2024-05-07 RX ADMIN — Medication 975 MILLIGRAM(S): at 08:57

## 2024-05-07 RX ADMIN — Medication 975 MILLIGRAM(S): at 14:55

## 2024-05-07 RX ADMIN — Medication 975 MILLIGRAM(S): at 15:55

## 2024-05-07 RX ADMIN — SIMETHICONE 80 MILLIGRAM(S): 80 TABLET, CHEWABLE ORAL at 18:02

## 2024-05-07 RX ADMIN — ENOXAPARIN SODIUM 40 MILLIGRAM(S): 100 INJECTION SUBCUTANEOUS at 13:52

## 2024-05-07 RX ADMIN — Medication 975 MILLIGRAM(S): at 21:12

## 2024-05-07 RX ADMIN — Medication 30 MILLIGRAM(S): at 11:23

## 2024-05-07 RX ADMIN — Medication 30 MILLIGRAM(S): at 05:15

## 2024-05-07 RX ADMIN — Medication 30 MILLIGRAM(S): at 11:48

## 2024-05-07 RX ADMIN — Medication 30 MILLIGRAM(S): at 18:01

## 2024-05-07 NOTE — PROGRESS NOTE ADULT - ASSESSMENT
A/P: 33y now P1, s/p primary LTCS for category II tracing, pod #0, ebl 800, recovering well     - pain management with PO pain meds   - monitor vitals/bleeding   - encourage incentive spirometry   - ambulation/PO hydration  - advance diet as tolerated   - simethicone  - SCDs/lovenox for DVT prophylaxis   - f/u 1100 labs   - routine postop care     to be d/w dr. perez and dr. jenkins.

## 2024-05-07 NOTE — PROGRESS NOTE ADULT - SUBJECTIVE AND OBJECTIVE BOX
PGY2 Note:  Section    Pt seen and evaluated at bedside. Pain well controlled. Denies dizziness/lightheadedness/CP/SOB/palpitations. Denies fever, chills, nausea/vomiting, diarrhea, dysuria, LE pain.     Ambulating: No  Voiding: sahu in place, adequate output   Flatus: no  Bowel movements: no  Breast or bottle feeding: breast  Diet: tolerating regular     Physical Exam  Vital Signs Last 24 Hrs  T(C): 36.8 (07 May 2024 03:16), Max: 37.4 (06 May 2024 23:45)  T(F): 98.2 (07 May 2024 03:16), Max: 99.32 (06 May 2024 23:45)  HR: 73 (07 May 2024 05:07) (62 - 115)  BP: 112/60 (07 May 2024 05:07) (82/47 - 151/81)  BP(mean): --  RR: 20 (07 May 2024 05:07) (19 - 21)  SpO2: 97% (07 May 2024 04:19) (94% - 100%)    Parameters below as of 07 May 2024 03:46  Patient On (Oxygen Delivery Method): room air      Gen: AAOx3, NAD  Heart: RRR, S1S2+  Lungs: CTA B/L, no r/r/w   Fundus: firm, below umbilicus   Wound: abdominal dressing in place, clean no drainage.   Abd: Soft, nontender, nondistended, BS+  Lochia: minimal   Ext: No calf tenderness, no swelling    Labs:                        12.4   12.94 )-----------( 322      ( 06 May 2024 11:50 )             36.8        MEDICATIONS  (STANDING):  acetaminophen     Tablet .. 975 milliGRAM(s) Oral <User Schedule>  acetaminophen   IVPB .. 1000 milliGRAM(s) IV Intermittent once  diphtheria/tetanus/pertussis (acellular) Vaccine (Adacel) 0.5 milliLiter(s) IntraMuscular once  enoxaparin Injectable 40 milliGRAM(s) SubCutaneous every 24 hours  fentanyl (2 MICROgram(s)/mL) + bupivacaine 0.0625%  in 0.9% Sodium Chloride PCEA 250 milliLiter(s) Epidural PCA Continuous  ibuprofen  Tablet. 600 milliGRAM(s) Oral every 6 hours  ketorolac   Injectable 30 milliGRAM(s) IV Push every 6 hours  lactated ringers. 1000 milliLiter(s) (125 mL/Hr) IV Continuous <Continuous>  measles/mumps/rubella Vaccine 0.5 milliLiter(s) SubCutaneous once  morphine PF Epidural 2 milliGRAM(s) Epidural once  oxytocin Infusion 333.333 milliUNIT(s)/Min (1000 mL/Hr) IV Continuous <Continuous>  oxytocin Infusion 333.333 milliUNIT(s)/Min (1000 mL/Hr) IV Continuous <Continuous>  oxytocin Infusion. 2 milliUNIT(s)/Min (2 mL/Hr) IV Continuous <Continuous>  simethicone 80 milliGRAM(s) Chew every 4 hours  varicella virus (live) Vaccine 0.5 milliLiter(s) SubCutaneous once    MEDICATIONS  (PRN):  dexAMETHasone  Injectable 4 milliGRAM(s) IV Push every 6 hours PRN Nausea  dexAMETHasone  Injectable 4 milliGRAM(s) IV Push every 6 hours PRN Nausea  diphenhydrAMINE 25 milliGRAM(s) Oral every 6 hours PRN Pruritus  lanolin Ointment 1 Application(s) Topical every 6 hours PRN Sore Nipples  magnesium hydroxide Suspension 30 milliLiter(s) Oral two times a day PRN Constipation  naloxone Injectable 0.1 milliGRAM(s) IV Push every 3 minutes PRN For ANY of the following changes in patient status:  A. Breaths Per Minute LESS THAN 10, B. Oxygen saturation LESS THAN 90%, C. Sedation score of 6 for Stop After: 4 Times  naloxone Injectable 0.1 milliGRAM(s) IV Push every 3 minutes PRN For ANY of the following changes in patient status:  A. RR LESS THAN 10 breaths per minute, B. Oxygen saturation LESS THAN 90%, C. Sedation score of 6  ondansetron Injectable 4 milliGRAM(s) IV Push every 6 hours PRN Nausea  ondansetron Injectable 4 milliGRAM(s) IV Push every 6 hours PRN Nausea  oxyCODONE    IR 5 milliGRAM(s) Oral every 3 hours PRN Moderate to Severe Pain (4-10)  oxyCODONE    IR 5 milliGRAM(s) Oral once PRN Moderate to Severe Pain (4-10)

## 2024-05-07 NOTE — OB PROVIDER DELIVERY SUMMARY - NSSELHIDDEN_OBGYN_ALL_OB_FT
[NS_DeliveryAttending1_OBGYN_ALL_OB_FT:[id number]=],[NS_DeliveryRN_OBGYN_ALL_OB_FT:[id number]=]

## 2024-05-07 NOTE — PROGRESS NOTE ADULT - SUBJECTIVE AND OBJECTIVE BOX
fhr 140s gbtb with decelerations with each ctx  ctx q 2 min  sve 9/9/-2  pt still not making progress.  will perform c/s  consent obtained rba discussed.

## 2024-05-07 NOTE — BRIEF OPERATIVE NOTE - COMMENTS
Fetal Pillow used to elevated fetal head: LOT: [id number], EXP 2023-07-01; removed at end of procedure  Fetal Pillow used to elevated fetal head: LOT: [id number], EXP 2023-07-01; removed at end of procedure     DICTATION #: [id number]

## 2024-05-07 NOTE — OB PROVIDER DELIVERY SUMMARY - NSPROVIDERDELIVERYNOTE_OBGYN_ALL_OB_FT
Primary Pfannenstiel skin incision, low transverse hysterotomy  Meconium stained amniontic fluid  Viable Male infant, APGARs 9/9, weighing 3480g.     See operative report for complete details.

## 2024-05-07 NOTE — OB RN DELIVERY SUMMARY - NSSELHIDDEN_OBGYN_ALL_OB_FT
[NS_DeliveryAttending1_OBGYN_ALL_OB_FT:[id number]=],[NS_DeliveryRN_OBGYN_ALL_OB_FT:[id number]=]

## 2024-05-07 NOTE — OB RN INTRAOPERATIVE NOTE - NSSELHIDDEN_OBGYN_ALL_OB_FT
[NS_DeliveryAttending1_OBGYN_ALL_OB_FT:[id number]=],[NS_DeliveryRN_OBGYN_ALL_OB_FT:[id number]=]

## 2024-05-07 NOTE — BRIEF OPERATIVE NOTE - NSICDXBRIEFPOSTOP_GEN_ALL_CORE_FT
POST-OP DIAGNOSIS:  Category II fetal heart rate tracing during labor and delivery 07-May-2024 02:19:46  Amarilis Alvarez

## 2024-05-07 NOTE — BRIEF OPERATIVE NOTE - NSICDXBRIEFPREOP_GEN_ALL_CORE_FT
PRE-OP DIAGNOSIS:  Category II fetal heart rate tracing during labor and delivery 07-May-2024 02:19:42  Amarilis Alvarez

## 2024-05-07 NOTE — BRIEF OPERATIVE NOTE - OPERATION/FINDINGS
Primary Pfannenstiel skin incision, low transverse hysterotomy  Viable male infant, meconium stained fluid, APGARs 9/9, weighing 3480g  Normal fallopian tubes and ovaries bilaterally.

## 2024-05-08 ENCOUNTER — TRANSCRIPTION ENCOUNTER (OUTPATIENT)
Age: 34
End: 2024-05-08

## 2024-05-08 RX ORDER — OXYCODONE HYDROCHLORIDE 5 MG/1
1 TABLET ORAL
Qty: 0 | Refills: 0 | DISCHARGE
Start: 2024-05-08

## 2024-05-08 RX ORDER — OXYCODONE HYDROCHLORIDE 5 MG/1
5 TABLET ORAL
Refills: 0 | Status: DISCONTINUED | OUTPATIENT
Start: 2024-05-08 | End: 2024-05-10

## 2024-05-08 RX ORDER — ACETAMINOPHEN 500 MG
3 TABLET ORAL
Qty: 0 | Refills: 0 | DISCHARGE
Start: 2024-05-08

## 2024-05-08 RX ORDER — IBUPROFEN 200 MG
1 TABLET ORAL
Qty: 0 | Refills: 0 | DISCHARGE
Start: 2024-05-08

## 2024-05-08 RX ADMIN — Medication 600 MILLIGRAM(S): at 06:29

## 2024-05-08 RX ADMIN — Medication 600 MILLIGRAM(S): at 11:56

## 2024-05-08 RX ADMIN — SIMETHICONE 80 MILLIGRAM(S): 80 TABLET, CHEWABLE ORAL at 13:26

## 2024-05-08 RX ADMIN — Medication 975 MILLIGRAM(S): at 10:14

## 2024-05-08 RX ADMIN — Medication 975 MILLIGRAM(S): at 04:10

## 2024-05-08 RX ADMIN — SIMETHICONE 80 MILLIGRAM(S): 80 TABLET, CHEWABLE ORAL at 18:01

## 2024-05-08 RX ADMIN — SIMETHICONE 80 MILLIGRAM(S): 80 TABLET, CHEWABLE ORAL at 09:14

## 2024-05-08 RX ADMIN — Medication 600 MILLIGRAM(S): at 01:00

## 2024-05-08 RX ADMIN — Medication 600 MILLIGRAM(S): at 18:01

## 2024-05-08 RX ADMIN — Medication 975 MILLIGRAM(S): at 05:34

## 2024-05-08 RX ADMIN — Medication 600 MILLIGRAM(S): at 12:53

## 2024-05-08 RX ADMIN — ENOXAPARIN SODIUM 40 MILLIGRAM(S): 100 INJECTION SUBCUTANEOUS at 13:26

## 2024-05-08 RX ADMIN — Medication 0.5 MILLILITER(S): at 11:54

## 2024-05-08 RX ADMIN — Medication 975 MILLIGRAM(S): at 21:00

## 2024-05-08 RX ADMIN — OXYCODONE HYDROCHLORIDE 5 MILLIGRAM(S): 5 TABLET ORAL at 14:55

## 2024-05-08 RX ADMIN — Medication 600 MILLIGRAM(S): at 00:05

## 2024-05-08 RX ADMIN — Medication 600 MILLIGRAM(S): at 06:47

## 2024-05-08 RX ADMIN — MAGNESIUM HYDROXIDE 30 MILLILITER(S): 400 TABLET, CHEWABLE ORAL at 09:14

## 2024-05-08 RX ADMIN — Medication 975 MILLIGRAM(S): at 22:00

## 2024-05-08 RX ADMIN — OXYCODONE HYDROCHLORIDE 5 MILLIGRAM(S): 5 TABLET ORAL at 04:21

## 2024-05-08 RX ADMIN — SIMETHICONE 80 MILLIGRAM(S): 80 TABLET, CHEWABLE ORAL at 06:30

## 2024-05-08 RX ADMIN — OXYCODONE HYDROCHLORIDE 5 MILLIGRAM(S): 5 TABLET ORAL at 15:55

## 2024-05-08 RX ADMIN — Medication 975 MILLIGRAM(S): at 09:14

## 2024-05-08 RX ADMIN — SIMETHICONE 80 MILLIGRAM(S): 80 TABLET, CHEWABLE ORAL at 21:00

## 2024-05-08 NOTE — DISCHARGE NOTE OB - MATERIALS PROVIDED
Vaccinations/[facility]  Screening Program/  Immunization Record/Breastfeeding Log/Bottle Feeding Log/Guide to Postpartum Care/[facility] Hearing Screen Program/Back To Sleep Handout/Shaken Baby Prevention Handout/Breastfeeding Guide and Packet/Birth Certificate Instructions

## 2024-05-08 NOTE — DISCHARGE NOTE OB - MEDICATION SUMMARY - MEDICATIONS TO TAKE
I will START or STAY ON the medications listed below when I get home from the hospital:    ibuprofen 600 mg oral tablet  -- 1 tab(s) by mouth every 6 hours  -- Indication: For pain    acetaminophen 325 mg oral tablet  -- 3 tab(s) by mouth every 6 hours  -- Indication: For pain    oxyCODONE 5 mg oral tablet  -- 1 tab(s) by mouth once as needed for Moderate to Severe Pain (4-10)  -- Indication: For severe pain   I will START or STAY ON the medications listed below when I get home from the hospital:    ibuprofen 600 mg oral tablet  -- 1 tab(s) by mouth every 6 hours  -- Indication: For pain    acetaminophen 325 mg oral tablet  -- 3 tab(s) by mouth every 6 hours  -- Indication: For pain    oxyCODONE 5 mg oral tablet  -- 1 tab(s) by mouth every 6 hours as needed for Moderate to Severe Pain (4-10) MDD: 4  -- Indication: For severe pain

## 2024-05-08 NOTE — DISCHARGE NOTE OB - CARE PROVIDER_API CALL
Ethan Mendez  Obstetrics and Gynecology  [address] [place] [address]  Phone: [phone]  Fax: [phone]  Follow Up Time:    no

## 2024-05-08 NOTE — PROGRESS NOTE ADULT - SUBJECTIVE AND OBJECTIVE BOX
PGY2 Note:  Section    Pt seen and evaluated at bedside. Pain well controlled. Denies dizziness/lightheadedness/CP/SOB/palpitations. Denies fever, chills, nausea/vomiting, diarrhea, dysuria, LE pain.     Ambulating: Yes  Voiding: Yes  Flatus: Yes  Bowel movements: No  Breast or bottle feeding: breast  Diet: tolerating regular diet     Physical Exam  Vital Signs Last 24 Hrs  T(C): 36.6 (08 May 2024 03:05), Max: 37 (07 May 2024 23:37)  T(F): 97.8 (08 May 2024 03:05), Max: 98.6 (07 May 2024 23:37)  HR: 76 (08 May 2024 03:05) (71 - 96)  BP: 87/55 (08 May 2024 03:05) (87/55 - 111/65)  RR: 18 (08 May 2024 03:05) (18 - 18)    Gen: AAOx3, NAD  Heart: RRR, S1S2+  Lungs: CTA B/L, no r/r/w   Fundus: firm, below umbilicus   Wound: Pfannenstiel incision, steris in place c/d/i   Abd: Soft, nontender, nondistended, BS+  Lochia: minimal   Ext: No calf tenderness, no swelling    Labs:                        9.2    13.63 )-----------( 252      ( 07 May 2024 21:17 )             27.0                         12.4   12.94 )-----------( 322      ( 06 May 2024 11:50 )             36.8        MEDICATIONS  (STANDING):  acetaminophen     Tablet .. 975 milliGRAM(s) Oral <User Schedule>  acetaminophen   IVPB .. 1000 milliGRAM(s) IV Intermittent once  diphtheria/tetanus/pertussis (acellular) Vaccine (Adacel) 0.5 milliLiter(s) IntraMuscular once  enoxaparin Injectable 40 milliGRAM(s) SubCutaneous every 24 hours  fentanyl (2 MICROgram(s)/mL) + bupivacaine 0.0625%  in 0.9% Sodium Chloride PCEA 250 milliLiter(s) Epidural PCA Continuous  ibuprofen  Tablet. 600 milliGRAM(s) Oral every 6 hours  lactated ringers. 1000 milliLiter(s) (125 mL/Hr) IV Continuous <Continuous>  measles/mumps/rubella Vaccine 0.5 milliLiter(s) SubCutaneous once  morphine PF Epidural 2 milliGRAM(s) Epidural once  oxytocin Infusion 333.333 milliUNIT(s)/Min (1000 mL/Hr) IV Continuous <Continuous>  oxytocin Infusion 333.333 milliUNIT(s)/Min (1000 mL/Hr) IV Continuous <Continuous>  oxytocin Infusion. 2 milliUNIT(s)/Min (2 mL/Hr) IV Continuous <Continuous>  simethicone 80 milliGRAM(s) Chew every 4 hours  varicella virus (live) Vaccine 0.5 milliLiter(s) SubCutaneous once    MEDICATIONS  (PRN):  dexAMETHasone  Injectable 4 milliGRAM(s) IV Push every 6 hours PRN Nausea  dexAMETHasone  Injectable 4 milliGRAM(s) IV Push every 6 hours PRN Nausea  diphenhydrAMINE 25 milliGRAM(s) Oral every 6 hours PRN Pruritus  lanolin Ointment 1 Application(s) Topical every 6 hours PRN Sore Nipples  magnesium hydroxide Suspension 30 milliLiter(s) Oral two times a day PRN Constipation  naloxone Injectable 0.1 milliGRAM(s) IV Push every 3 minutes PRN For ANY of the following changes in patient status:  A. Breaths Per Minute LESS THAN 10, B. Oxygen saturation LESS THAN 90%, C. Sedation score of 6 for Stop After: 4 Times  naloxone Injectable 0.1 milliGRAM(s) IV Push every 3 minutes PRN For ANY of the following changes in patient status:  A. RR LESS THAN 10 breaths per minute, B. Oxygen saturation LESS THAN 90%, C. Sedation score of 6  ondansetron Injectable 4 milliGRAM(s) IV Push every 6 hours PRN Nausea  ondansetron Injectable 4 milliGRAM(s) IV Push every 6 hours PRN Nausea  oxyCODONE    IR 5 milliGRAM(s) Oral every 3 hours PRN Moderate to Severe Pain (4-10)  oxyCODONE    IR 5 milliGRAM(s) Oral once PRN Moderate to Severe Pain (4-10)

## 2024-05-08 NOTE — PROGRESS NOTE ADULT - ASSESSMENT
S/P CS POD#1, recovering well  - encourage ambulation  - PO hydration  - Continue diet as tolerated

## 2024-05-08 NOTE — DISCHARGE NOTE OB - PATIENT PORTAL LINK FT
You can access the FollowMyHealth Patient Portal offered by [facility] by registering at the following website: [email or web address]. By joining [facility]’s FollowMyHealth portal, you will also be able to view your health information using other applications (apps) compatible with our system.

## 2024-05-08 NOTE — DISCHARGE NOTE OB - HOSPITAL COURSE
Admitted for IOL, underwent primary LTCS for cat2 tracing, .  Uncomplicated operative and postoperative course. Discharged home in stable condition.  Admitted for IOL, underwent primary LTCS for cat2 tracing, .  Uncomplicated operative and postoperative course. Discharged home in stable condition.

## 2024-05-08 NOTE — DISCHARGE NOTE OB - NS MD DC FALL RISK RISK
For information on Fall & Injury Prevention, visit: [email or web address] OR  [email or web address] OR  https://www.cdc.gov/steadi/patient.html

## 2024-05-08 NOTE — PROGRESS NOTE ADULT - ASSESSMENT
A/P: 33y now P1, s/p primary ltcs for cat2 tracing, ebl 800, pod#1, recovering well     - pain management with PO pain meds   - monitor vitals/bleeding   - encourage incentive spirometry   - ambulation/PO hydration  - advance diet as tolerated   - simethicone  - SCDs/lovenox for DVT prophylaxis   - routine postop care ; anticipate dc tomorrow     d/w dr. perez and dr. jenkins

## 2024-05-08 NOTE — PROGRESS NOTE ADULT - SUBJECTIVE AND OBJECTIVE BOX
Patient seen and examined. Pain well controlled at this time. No complaints at this time. Denies fever, chills, nausea, vomiting, chest pain, shortness of breath, severe abdominal pain, heavy vaginal bleeding.   Patient is ambulating. Passing flatus.  Tolerating regular diet.  Voiding without difficulty, no dysuria       Physical Exam  Vital Signs Last 24 Hrs  T(C): 36.7 (08 May 2024 08:17), Max: 37 (07 May 2024 23:37)  T(F): 98 (08 May 2024 08:17), Max: 98.6 (07 May 2024 23:37)  HR: 81 (08 May 2024 08:17) (76 - 96)  BP: 100/55 (08 May 2024 08:17) (87/55 - 108/56)  BP(mean): --  RR: 18 (08 May 2024 08:17) (18 - 18)  SpO2: --      Gen: AAOx3, NAD  Cardio: RRR, S1S2, no M/R/G  Resp: CTAB  Ext: No calf tenderness, no swelling  Fundus: firm, below umbilicus. Nontender.   Abd: Soft, nontender, nondistended, BS+  Incision: clean, dry, intact  Lochia: minimal       Labs:                        9.2    13.63 )-----------( 252      ( 07 May 2024 21:17 )             27.0                         12.4   12.94 )-----------( 322      ( 06 May 2024 11:50 )             36.8

## 2024-05-09 RX ADMIN — SIMETHICONE 80 MILLIGRAM(S): 80 TABLET, CHEWABLE ORAL at 15:26

## 2024-05-09 RX ADMIN — Medication 975 MILLIGRAM(S): at 09:10

## 2024-05-09 RX ADMIN — Medication 975 MILLIGRAM(S): at 13:19

## 2024-05-09 RX ADMIN — SIMETHICONE 80 MILLIGRAM(S): 80 TABLET, CHEWABLE ORAL at 09:10

## 2024-05-09 RX ADMIN — SIMETHICONE 80 MILLIGRAM(S): 80 TABLET, CHEWABLE ORAL at 21:04

## 2024-05-09 RX ADMIN — SIMETHICONE 80 MILLIGRAM(S): 80 TABLET, CHEWABLE ORAL at 06:20

## 2024-05-09 RX ADMIN — Medication 600 MILLIGRAM(S): at 12:54

## 2024-05-09 RX ADMIN — Medication 600 MILLIGRAM(S): at 13:54

## 2024-05-09 RX ADMIN — MAGNESIUM HYDROXIDE 30 MILLILITER(S): 400 TABLET, CHEWABLE ORAL at 09:10

## 2024-05-09 RX ADMIN — Medication 975 MILLIGRAM(S): at 16:25

## 2024-05-09 RX ADMIN — Medication 600 MILLIGRAM(S): at 06:21

## 2024-05-09 RX ADMIN — Medication 600 MILLIGRAM(S): at 18:37

## 2024-05-09 RX ADMIN — Medication 975 MILLIGRAM(S): at 04:00

## 2024-05-09 RX ADMIN — Medication 600 MILLIGRAM(S): at 00:07

## 2024-05-09 RX ADMIN — Medication 600 MILLIGRAM(S): at 23:38

## 2024-05-09 RX ADMIN — Medication 975 MILLIGRAM(S): at 22:00

## 2024-05-09 RX ADMIN — Medication 975 MILLIGRAM(S): at 15:26

## 2024-05-09 RX ADMIN — SIMETHICONE 80 MILLIGRAM(S): 80 TABLET, CHEWABLE ORAL at 18:38

## 2024-05-09 RX ADMIN — Medication 600 MILLIGRAM(S): at 01:00

## 2024-05-09 RX ADMIN — Medication 975 MILLIGRAM(S): at 21:04

## 2024-05-09 RX ADMIN — ENOXAPARIN SODIUM 40 MILLIGRAM(S): 100 INJECTION SUBCUTANEOUS at 15:25

## 2024-05-09 RX ADMIN — Medication 975 MILLIGRAM(S): at 03:14

## 2024-05-09 NOTE — PROGRESS NOTE ADULT - SUBJECTIVE AND OBJECTIVE BOX
Patient seen and examined. Pain well controlled at this time. No complaints at this time. Denies fever, chills, nausea, vomiting, chest pain, shortness of breath, severe abdominal pain, heavy vaginal bleeding.   Patient is ambulating. Passing flatus. Tolerating regular diet.  Voiding without difficulty, no dysuria. +Breastfeeding    Physical Exam  Vital Signs Last 24 Hrs  T(C): 36.8 (09 May 2024 08:12), Max: 36.9 (08 May 2024 15:48)  T(F): 98.2 (09 May 2024 08:12), Max: 98.4 (08 May 2024 15:48)  HR: 97 (09 May 2024 08:12) (76 - 97)  BP: 127/88 (09 May 2024 08:12) (105/58 - 127/88)  BP(mean): --  RR: 18 (09 May 2024 08:12) (16 - 18)  SpO2: --      Gen: AAOx3, NAD  Cardio: RRR, S1S2, no M/R/G  Resp: CTAB  Ext: No calf tenderness, no swelling  Fundus: firm, below umbilicus. Nontender.   Abd: Soft, nontender, nondistended, BS+  Incision: c/d/i  Lochia: minimal    Labs:                        9.2    13.63 )-----------( 252      ( 07 May 2024 21:17 )             27.0                         12.4   12.94 )-----------( 322      ( 06 May 2024 11:50 )             36.8         Patient seen and examined. Pain well controlled at this time. No complaints at this time. Denies fever, chills, nausea, vomiting, chest pain, shortness of breath, severe abdominal pain, heavy vaginal bleeding.   Patient is ambulating. Passing flatus.  No BM. Tolerating regular diet.  Voiding without difficulty, no dysuria. +Breastfeeding with some difficulty latching.    Physical Exam  Vital Signs Last 24 Hrs  T(C): 36.8 (09 May 2024 08:12), Max: 36.9 (08 May 2024 15:48)  T(F): 98.2 (09 May 2024 08:12), Max: 98.4 (08 May 2024 15:48)  HR: 97 (09 May 2024 08:12) (76 - 97)  BP: 127/88 (09 May 2024 08:12) (105/58 - 127/88)  BP(mean): --  RR: 18 (09 May 2024 08:12) (16 - 18)  SpO2: --      Gen: AAOx3, NAD  Cardio: RRR, S1S2, no M/R/G  Resp: CTAB  Ext: No calf tenderness, no swelling  Fundus: firm, below umbilicus. Nontender.   Abd: Soft, nontender, nondistended, BS+  Incision: c/d/i  Lochia: minimal    Labs:                        9.2    13.63 )-----------( 252      ( 07 May 2024 21:17 )             27.0                         12.4   12.94 )-----------( 322      ( 06 May 2024 11:50 )             36.8

## 2024-05-09 NOTE — PROGRESS NOTE ADULT - SUBJECTIVE AND OBJECTIVE BOX
PGY2 Note:  Section    Pt seen and evaluated at bedside. Pain well controlled. Denies dizziness/lightheadedness/CP/SOB/palpitations. Denies fever, chills, nausea/vomiting, diarrhea, dysuria, LE pain.     Ambulating: Yes  Voiding: Yes  Flatus: Yes  Bowel movements: No  Breast or bottle feeding: breast  Diet: tolerating regular diet     Physical Exam  Vital Signs Last 24 Hrs  T(C): 36.6 (08 May 2024 23:36), Max: 36.9 (08 May 2024 15:48)  T(F): 97.8 (08 May 2024 23:36), Max: 98.4 (08 May 2024 15:48)  HR: 76 (08 May 2024 23:36) (76 - 89)  BP: 120/60 (08 May 2024 23:36) (100/55 - 120/60)  RR: 18 (08 May 2024 23:36) (16 - 18)      Gen: AAOx3, NAD  Heart: RRR, S1S2+  Lungs: CTA B/L, no r/r/w   Fundus: firm, below umbilicus   Wound: Pfannenstiel incision, steris in place c/d/i   Abd: Soft, nontender, nondistended, BS+  Lochia: minimal   Ext: No calf tenderness, no swelling    Labs:                        9.2    13.63 )-----------( 252      ( 07 May 2024 21:17 )             27.0                         12.4   12.94 )-----------( 322      ( 06 May 2024 11:50 )             36.8        MEDICATIONS  (STANDING):  acetaminophen     Tablet .. 975 milliGRAM(s) Oral <User Schedule>  diphtheria/tetanus/pertussis (acellular) Vaccine (Adacel) 0.5 milliLiter(s) IntraMuscular once  enoxaparin Injectable 40 milliGRAM(s) SubCutaneous every 24 hours  fentanyl (2 MICROgram(s)/mL) + bupivacaine 0.0625%  in 0.9% Sodium Chloride PCEA 250 milliLiter(s) Epidural PCA Continuous  ibuprofen  Tablet. 600 milliGRAM(s) Oral every 6 hours  lactated ringers. 1000 milliLiter(s) (125 mL/Hr) IV Continuous <Continuous>  measles/mumps/rubella Vaccine 0.5 milliLiter(s) SubCutaneous once  morphine PF Epidural 2 milliGRAM(s) Epidural once  oxytocin Infusion 333.333 milliUNIT(s)/Min (1000 mL/Hr) IV Continuous <Continuous>  oxytocin Infusion 333.333 milliUNIT(s)/Min (1000 mL/Hr) IV Continuous <Continuous>  oxytocin Infusion. 2 milliUNIT(s)/Min (2 mL/Hr) IV Continuous <Continuous>  simethicone 80 milliGRAM(s) Chew every 4 hours    MEDICATIONS  (PRN):  dexAMETHasone  Injectable 4 milliGRAM(s) IV Push every 6 hours PRN Nausea  dexAMETHasone  Injectable 4 milliGRAM(s) IV Push every 6 hours PRN Nausea  diphenhydrAMINE 25 milliGRAM(s) Oral every 6 hours PRN Pruritus  lanolin Ointment 1 Application(s) Topical every 6 hours PRN Sore Nipples  magnesium hydroxide Suspension 30 milliLiter(s) Oral two times a day PRN Constipation  naloxone Injectable 0.1 milliGRAM(s) IV Push every 3 minutes PRN For ANY of the following changes in patient status:  A. RR LESS THAN 10 breaths per minute, B. Oxygen saturation LESS THAN 90%, C. Sedation score of 6  naloxone Injectable 0.1 milliGRAM(s) IV Push every 3 minutes PRN For ANY of the following changes in patient status:  A. Breaths Per Minute LESS THAN 10, B. Oxygen saturation LESS THAN 90%, C. Sedation score of 6 for Stop After: 4 Times  ondansetron Injectable 4 milliGRAM(s) IV Push every 6 hours PRN Nausea  ondansetron Injectable 4 milliGRAM(s) IV Push every 6 hours PRN Nausea  oxyCODONE    IR 5 milliGRAM(s) Oral every 3 hours PRN Moderate to Severe Pain (4-10)  oxyCODONE    IR 5 milliGRAM(s) Oral once PRN Moderate to Severe Pain (4-10)

## 2024-05-09 NOTE — PROGRESS NOTE ADULT - ASSESSMENT
A/P: 33y now P1, s/p LTCS for cat 2 tracing, pod#2,  recovering well   - pain management with PO pain meds   - monitor vitals/bleeding   - encourage incentive spirometry   - ambulation/PO hydration  - advance diet as tolerated   - simethicone  - SCDs/lovenox for DVT prophylaxis   - routine postop care   d/w dr. perez and dr. jenkins

## 2024-05-09 NOTE — PROGRESS NOTE ADULT - ASSESSMENT
S/P CS POD#2  , recovering well  - encourage ambulation & bresatfeeding  - D/C home  and follow up in 1 week with Dr. Mendez.  Call for appt.     S/P CS POD#2  , recovering well  - encourage ambulation & breastfeeding  - Continue pain managment  -Anticipate D/C home in AM

## 2024-05-10 VITALS
DIASTOLIC BLOOD PRESSURE: 80 MMHG | HEART RATE: 72 BPM | TEMPERATURE: 98 F | RESPIRATION RATE: 18 BRPM | SYSTOLIC BLOOD PRESSURE: 127 MMHG

## 2024-05-10 RX ORDER — OXYCODONE HYDROCHLORIDE 5 MG/1
1 TABLET ORAL
Qty: 12 | Refills: 0
Start: 2024-05-10 | End: 2024-05-12

## 2024-05-10 RX ADMIN — Medication 975 MILLIGRAM(S): at 08:44

## 2024-05-10 RX ADMIN — SIMETHICONE 80 MILLIGRAM(S): 80 TABLET, CHEWABLE ORAL at 11:32

## 2024-05-10 RX ADMIN — SIMETHICONE 80 MILLIGRAM(S): 80 TABLET, CHEWABLE ORAL at 06:05

## 2024-05-10 RX ADMIN — Medication 975 MILLIGRAM(S): at 03:45

## 2024-05-10 RX ADMIN — Medication 975 MILLIGRAM(S): at 03:15

## 2024-05-10 RX ADMIN — Medication 600 MILLIGRAM(S): at 11:44

## 2024-05-10 RX ADMIN — Medication 600 MILLIGRAM(S): at 06:39

## 2024-05-10 RX ADMIN — SIMETHICONE 80 MILLIGRAM(S): 80 TABLET, CHEWABLE ORAL at 03:15

## 2024-05-10 RX ADMIN — Medication 600 MILLIGRAM(S): at 06:05

## 2024-05-10 RX ADMIN — Medication 0.5 MILLILITER(S): at 08:24

## 2024-05-10 NOTE — PROGRESS NOTE ADULT - SUBJECTIVE AND OBJECTIVE BOX
PGY2 Note:  Section    Pt seen and evaluated at bedside. Pain well controlled. Denies dizziness/lightheadedness/CP/SOB/palpitations. Denies fever, chills, nausea/vomiting, diarrhea, dysuria, LE pain.     Ambulating: Yes  Voiding: Yes  Flatus: Yes  Bowel movements: No  Breast or bottle feeding: breast  Diet: tolerating regular diet     Physical Exam  Vital Signs Last 24 Hrs  T(C): 36.6 (09 May 2024 23:26), Max: 36.8 (09 May 2024 08:12)  T(F): 97.8 (09 May 2024 23:26), Max: 98.3 (09 May 2024 15:24)  HR: 69 (09 May 2024 23:26) (69 - 97)  BP: 134/76 (09 May 2024 23:26) (115/57 - 134/76)  BP(mean): --  RR: 18 (09 May 2024 23:26) (18 - 18)  SpO2: --      Gen: AAOx3, NAD  Heart: RRR, S1S2+  Lungs: CTA B/L, no r/r/w   Fundus: firm, below umbilicus   Wound: Pfannenstiel incision, steris in place c/d/i   Abd: Soft, nontender, nondistended, BS+  Lochia: minimal   Ext: No calf tenderness, no swelling    Labs:                        9.2    13.63 )-----------( 252      ( 07 May 2024 21:17 )             27.0                         12.4   12.94 )-----------( 322      ( 06 May 2024 11:50 )             36.8        MEDICATIONS  (STANDING):  acetaminophen     Tablet .. 975 milliGRAM(s) Oral <User Schedule>  diphtheria/tetanus/pertussis (acellular) Vaccine (Adacel) 0.5 milliLiter(s) IntraMuscular once  enoxaparin Injectable 40 milliGRAM(s) SubCutaneous every 24 hours  fentanyl (2 MICROgram(s)/mL) + bupivacaine 0.0625%  in 0.9% Sodium Chloride PCEA 250 milliLiter(s) Epidural PCA Continuous  ibuprofen  Tablet. 600 milliGRAM(s) Oral every 6 hours  lactated ringers. 1000 milliLiter(s) (125 mL/Hr) IV Continuous <Continuous>  measles/mumps/rubella Vaccine 0.5 milliLiter(s) SubCutaneous once  morphine PF Epidural 2 milliGRAM(s) Epidural once  oxytocin Infusion 333.333 milliUNIT(s)/Min (1000 mL/Hr) IV Continuous <Continuous>  oxytocin Infusion 333.333 milliUNIT(s)/Min (1000 mL/Hr) IV Continuous <Continuous>  oxytocin Infusion. 2 milliUNIT(s)/Min (2 mL/Hr) IV Continuous <Continuous>  simethicone 80 milliGRAM(s) Chew every 4 hours    MEDICATIONS  (PRN):  dexAMETHasone  Injectable 4 milliGRAM(s) IV Push every 6 hours PRN Nausea  dexAMETHasone  Injectable 4 milliGRAM(s) IV Push every 6 hours PRN Nausea  diphenhydrAMINE 25 milliGRAM(s) Oral every 6 hours PRN Pruritus  lanolin Ointment 1 Application(s) Topical every 6 hours PRN Sore Nipples  magnesium hydroxide Suspension 30 milliLiter(s) Oral two times a day PRN Constipation  naloxone Injectable 0.1 milliGRAM(s) IV Push every 3 minutes PRN For ANY of the following changes in patient status:  A. RR LESS THAN 10 breaths per minute, B. Oxygen saturation LESS THAN 90%, C. Sedation score of 6  naloxone Injectable 0.1 milliGRAM(s) IV Push every 3 minutes PRN For ANY of the following changes in patient status:  A. Breaths Per Minute LESS THAN 10, B. Oxygen saturation LESS THAN 90%, C. Sedation score of 6 for Stop After: 4 Times  ondansetron Injectable 4 milliGRAM(s) IV Push every 6 hours PRN Nausea  ondansetron Injectable 4 milliGRAM(s) IV Push every 6 hours PRN Nausea  oxyCODONE    IR 5 milliGRAM(s) Oral every 3 hours PRN Moderate to Severe Pain (4-10)  oxyCODONE    IR 5 milliGRAM(s) Oral once PRN Moderate to Severe Pain (4-10)

## 2024-05-10 NOTE — PROGRESS NOTE ADULT - ASSESSMENT
A/P: 33y now P1, s/p LTCS for cat 2 tracing, pod#2,  recovering well   - pain management with PO pain meds   - monitor vitals/bleeding   - encourage incentive spirometry   - ambulation/PO hydration  - advance diet as tolerated   - simethicone  - SCDs/lovenox for DVT prophylaxis   - routine postop care   - dc once seen by social work   d/w dr. perez and dr. jenkins  A/P: 33y now P1, s/p LTCS for cat 2 tracing, pod#3,  recovering well   - pain management with PO pain meds   - monitor vitals/bleeding   - encourage incentive spirometry   - ambulation/PO hydration  - advance diet as tolerated   - simethicone  - SCDs/lovenox for DVT prophylaxis   - routine postop care   - dc once seen by social work   d/w dr. perez and dr. jenkins

## 2024-05-13 PROBLEM — J45.909 UNSPECIFIED ASTHMA, UNCOMPLICATED: Chronic | Status: ACTIVE | Noted: 2024-05-06

## 2024-05-14 ENCOUNTER — APPOINTMENT (OUTPATIENT)
Dept: OBGYN | Facility: CLINIC | Age: 34
End: 2024-05-14
Payer: COMMERCIAL

## 2024-05-14 DIAGNOSIS — Z09 ENCOUNTER FOR FOLLOW-UP EXAMINATION AFTER COMPLETED TREATMENT FOR CONDITIONS OTHER THAN MALIGNANT NEOPLASM: ICD-10-CM

## 2024-05-14 LAB — SURGICAL PATHOLOGY STUDY: SIGNIFICANT CHANGE UP

## 2024-05-14 PROCEDURE — 99024 POSTOP FOLLOW-UP VISIT: CPT

## 2024-05-14 NOTE — HISTORY OF PRESENT ILLNESS
[FreeTextEntry1] : ----34 Y/O   HERE FOR POST OP CHECK; C/S BOY; 24;NURSING. PMHX;/ PSHX;/APPY, ADENOIDS SOCIAL HX;-ETOH -CIGG  STD;    HPV/        STD PREVENTION DISCUSSED FAMILY HISTORY OF BREAST CANCER;MATERNAL GREAT GM REVIEW OF SYMPTOMS DONE; ALLERGIES; pt answered NKDA Medication reconciliation was completed by reviewing, with the patient's involvement, the patient's current outpatient medications and those  ordered for the patient today.           PE; ABDOMEN;SOFT NT ND INCISION CDI EXT -CCE  A;P;STABLE AFTER C/S -INSTRUCTIONS REVIEWED -RTC 2 WEEKS

## 2024-05-17 DIAGNOSIS — Z3A.41 41 WEEKS GESTATION OF PREGNANCY: ICD-10-CM

## 2024-05-17 DIAGNOSIS — J45.909 UNSPECIFIED ASTHMA, UNCOMPLICATED: ICD-10-CM

## 2024-05-17 DIAGNOSIS — Z98.890 OTHER SPECIFIED POSTPROCEDURAL STATES: ICD-10-CM

## 2024-05-17 DIAGNOSIS — O48.0 POST-TERM PREGNANCY: ICD-10-CM

## 2024-05-20 ENCOUNTER — APPOINTMENT (OUTPATIENT)
Dept: OBGYN | Facility: CLINIC | Age: 34
End: 2024-05-20
Payer: COMMERCIAL

## 2024-05-20 PROCEDURE — 99213 OFFICE O/P EST LOW 20 MIN: CPT

## 2024-05-20 RX ORDER — CEPHALEXIN 250 MG/1
250 TABLET ORAL EVERY 8 HOURS
Qty: 21 | Refills: 0 | Status: ACTIVE | COMMUNITY
Start: 2024-05-20 | End: 1900-01-01

## 2024-05-20 NOTE — HISTORY OF PRESENT ILLNESS
[FreeTextEntry1] : ----32 Y/O   HERE C/O RIGHT BREAT PAIN AND CHILLS. C/S BOY; 24;NURSING. PMHX;/ PSHX;/APPY, ADENOIDS SOCIAL HX;-ETOH -CIGG  STD;    HPV/        STD PREVENTION DISCUSSED FAMILY HISTORY OF BREAST CANCER;MATERNAL GREAT GM REVIEW OF SYMPTOMS DONE; ALLERGIES; pt answered NKDA Medication reconciliation was completed by reviewing, with the patient's involvement, the patient's current outpatient medications and those  ordered for the patient today.           PE;BREASTS; RIGHT;TENDER ERYTHEMA, ABDOMEN;SOFT NT ND INCISION CDI EXT -CCE  A;P;MASTITIS -WARM SOAKS -KEFLEX 250 TID X 7 DAYS -RTC 7 DAYS

## 2024-05-28 ENCOUNTER — APPOINTMENT (OUTPATIENT)
Dept: OBGYN | Facility: CLINIC | Age: 34
End: 2024-05-28
Payer: COMMERCIAL

## 2024-05-28 PROCEDURE — 99213 OFFICE O/P EST LOW 20 MIN: CPT

## 2024-05-28 NOTE — HISTORY OF PRESENT ILLNESS
[FreeTextEntry1] : ----34 Y/O   HERE C/O  MASTITIS;PT GIVEN ABX AND FEELING BETTER. C/S BOY; 24;NURSING. PMHX;/ PSHX;/APPY, ADENOIDS SOCIAL HX;-ETOH -CIGG  STD;    HPV/        STD PREVENTION DISCUSSED FAMILY HISTORY OF BREAST CANCER;MATERNAL GREAT GM REVIEW OF SYMPTOMS DONE; ALLERGIES; pt answered NKDA Medication reconciliation was completed by reviewing, with the patient's involvement, the patient's current outpatient medications and those  ordered for the patient today.           PE;BREASTS;IMPROVED, ABDOMEN;SOFT NT ND INCISION CDI EXT -CCE  A;P;MASTITIS;IMPROVING -RTC 4 WEEKS FOR PP EXAM -ANSWERED QUESTIONS.

## 2024-06-25 ENCOUNTER — APPOINTMENT (OUTPATIENT)
Dept: OBGYN | Facility: CLINIC | Age: 34
End: 2024-06-25
Payer: COMMERCIAL

## 2024-06-25 PROCEDURE — 0503F POSTPARTUM CARE VISIT: CPT

## 2024-09-12 ENCOUNTER — APPOINTMENT (OUTPATIENT)
Dept: OBGYN | Facility: CLINIC | Age: 34
End: 2024-09-12
Payer: COMMERCIAL

## 2024-09-12 ENCOUNTER — LABORATORY RESULT (OUTPATIENT)
Age: 34
End: 2024-09-12

## 2024-09-12 DIAGNOSIS — Z01.419 ENCOUNTER FOR GYNECOLOGICAL EXAMINATION (GENERAL) (ROUTINE) W/OUT ABNORMAL FINDINGS: ICD-10-CM

## 2024-09-12 PROCEDURE — 99395 PREV VISIT EST AGE 18-39: CPT

## 2024-09-12 RX ORDER — NORGESTIMATE AND ETHINYL ESTRADIOL 0.25-0.035
0.25-35 KIT ORAL
Qty: 84 | Refills: 0 | Status: ACTIVE | COMMUNITY
Start: 2024-09-12 | End: 1900-01-01

## 2024-09-15 LAB
C TRACH RRNA SPEC QL NAA+PROBE: NOT DETECTED
HPV HIGH+LOW RISK DNA PNL CVX: DETECTED
N GONORRHOEA RRNA SPEC QL NAA+PROBE: NOT DETECTED
SOURCE TP AMPLIFICATION: NORMAL

## 2024-09-19 ENCOUNTER — NON-APPOINTMENT (OUTPATIENT)
Age: 34
End: 2024-09-19

## 2024-10-08 ENCOUNTER — APPOINTMENT (OUTPATIENT)
Dept: OBGYN | Facility: CLINIC | Age: 34
End: 2024-10-08

## 2024-10-08 DIAGNOSIS — B97.7 PAPILLOMAVIRUS AS THE CAUSE OF DISEASES CLASSIFIED ELSEWHERE: ICD-10-CM

## 2024-10-08 PROCEDURE — 99213 OFFICE O/P EST LOW 20 MIN: CPT | Mod: 25

## 2024-10-08 PROCEDURE — 57454 BX/CURETT OF CERVIX W/SCOPE: CPT

## 2024-10-10 LAB — CORE LAB BIOPSY: NORMAL

## 2024-10-28 ENCOUNTER — APPOINTMENT (OUTPATIENT)
Dept: OBGYN | Facility: CLINIC | Age: 34
End: 2024-10-28

## 2024-10-28 DIAGNOSIS — B97.7 PAPILLOMAVIRUS AS THE CAUSE OF DISEASES CLASSIFIED ELSEWHERE: ICD-10-CM

## 2024-10-28 PROCEDURE — 99213 OFFICE O/P EST LOW 20 MIN: CPT

## 2024-12-10 RX ORDER — NORGESTIMATE AND ETHINYL ESTRADIOL 0.25-0.035
0.25-35 KIT ORAL DAILY
Qty: 3 | Refills: 3 | Status: ACTIVE | COMMUNITY
Start: 2024-12-10 | End: 1900-01-01

## 2025-03-03 RX ORDER — NORGESTIMATE AND ETHINYL ESTRADIOL 0.25-0.035
0.25-35 KIT ORAL DAILY
Qty: 3 | Refills: 3 | Status: ACTIVE | COMMUNITY
Start: 2025-03-03 | End: 1900-01-01

## 2025-04-07 ENCOUNTER — APPOINTMENT (OUTPATIENT)
Dept: OBGYN | Facility: CLINIC | Age: 35
End: 2025-04-07

## 2025-05-12 ENCOUNTER — APPOINTMENT (OUTPATIENT)
Dept: OBGYN | Facility: CLINIC | Age: 35
End: 2025-05-12
Payer: COMMERCIAL

## 2025-05-12 PROCEDURE — 99213 OFFICE O/P EST LOW 20 MIN: CPT

## 2025-09-15 ENCOUNTER — LABORATORY RESULT (OUTPATIENT)
Age: 35
End: 2025-09-15

## 2025-09-15 ENCOUNTER — NON-APPOINTMENT (OUTPATIENT)
Age: 35
End: 2025-09-15

## 2025-09-15 ENCOUNTER — APPOINTMENT (OUTPATIENT)
Dept: OBGYN | Facility: CLINIC | Age: 35
End: 2025-09-15
Payer: COMMERCIAL

## 2025-09-15 PROCEDURE — 99395 PREV VISIT EST AGE 18-39: CPT

## 2025-09-15 PROCEDURE — 99459 PELVIC EXAMINATION: CPT
